# Patient Record
(demographics unavailable — no encounter records)

---

## 2024-10-16 NOTE — CONSULT LETTER
[Dear  ___] : Dear  [unfilled], [Consult Letter:] : I had the pleasure of evaluating your patient, [unfilled]. [Please see my note below.] : Please see my note below. [Consult Closing:] : Thank you very much for allowing me to participate in the care of this patient.  If you have any questions, please do not hesitate to contact me. [Sincerely,] : Sincerely, [FreeTextEntry3] : Silvia Sellers D.O.  of Medicine Hematology/Oncology [facility]

## 2024-10-16 NOTE — REVIEW OF SYSTEMS
[Patient Intake Form Reviewed] : Patient intake form was reviewed [Fatigue] : fatigue [Joint Pain] : joint pain [Dizziness] : dizziness [Anxiety] : anxiety [Negative] : Heme/Lymph [FreeTextEntry9] : left rib pains  [de-identified] : occasional

## 2024-10-16 NOTE — HISTORY OF PRESENT ILLNESS
[de-identified] : This is a 48 year old female who is here for an evaluation of chronic leukopenia/neutropenia.   She has a history of Sjogren's disease with ocular/oral sicca, follows with Dr. Foley, HTN, hypothyroidism, papillary thyroid ca s/p partial right thyroidectomy in 2015.  She has a known leukopenia since 2019- her WBC ranging from 2.8-4.0/ANC 1.0-2.0. Normal Hg/plt.   CBC 8/20/24- WBC 2.7/ANC 1102 Hg 12.2 Plt 144 N40/L47/M10, MCV 81.2  6/24/2020- WBC 3.62 Flow cytometry 4/5/21- increased NK cells.   She was evaluated in the past with Dr. Escalona from hematology, thought to be related to her underlying rheumatologic disease.  She denies any frequent infections, no mouth sores.  No recent hospitalizations.   Has some left sided rib pains, no recent fall.  Had xrays at [facility], no result in chart.  No fever/chills, no night sweats, no weight loss.

## 2024-10-16 NOTE — ASSESSMENT
[FreeTextEntry1] : This is a 48 year old female with a chronic leukopenia/neutropenia since 2019.   Likely related to her underlying autoimmune disorder.    Her counts have been stable since 2019 with her WBC WBC ranging from 2.8-4.0/ANC 1.0-2.0.  No anemia or thrombocytopenia.  Low suspicion for underlying marrow disorder.  Will repeat a work up today- B12/folate, HIV/hepatitis, immunoelectrophoresis.  Also repeat a peripheral blood flow cytometry, in 2011 with increased NK cells.  Check her abdominal sonogram to evaluate for any splenomegaly- not palpable on exam.  If any change in her counts will plan for a bone marrow biopsy then.   She will follow up in 6 months.

## 2024-12-26 NOTE — PHYSICAL EXAM

## 2024-12-26 NOTE — HISTORY OF PRESENT ILLNESS
[FreeTextEntry1] : 49yo female fir evaluation of constipation   She has had chronic constipation for years. She has tried OTC meds without much help She is due for colon cancer screening. She is nervous jasson anesthesia as she was fuzzy for few days after egd anesthesia few years ago She has sjogrens Also with pain in left inferior ribcage

## 2025-03-16 NOTE — PHYSICAL EXAM
[Alert] : alert [Normal Voice/Communication] : normal voice/communication [Healthy Appearing] : healthy appearing [No Acute Distress] : no acute distress [Sclera] : the sclera and conjunctiva were normal [Hearing Threshold Finger Rub Not [place]] : hearing was normal [Normal Lips/Gums] : the lips and gums were normal [Oropharynx] : the oropharynx was normal [Normal Appearance] : the appearance of the neck was normal [No Neck Mass] : no neck mass was observed [No Respiratory Distress] : no respiratory distress [No Acc Muscle Use] : no accessory muscle use [Auscultation Breath Sounds / Voice Sounds] : lungs were clear to auscultation bilaterally [Respiration, Rhythm And Depth] : normal respiratory rhythm and effort [Heart Rate And Rhythm] : heart rate was normal and rhythm regular [Normal S1, S2] : normal S1 and S2 [Murmurs] : no murmurs [Bowel Sounds] : normal bowel sounds [Abdomen Tenderness] : non-tender [No Masses] : no abdominal mass palpated [Abdomen Soft] : soft [] : no hepatosplenomegaly [Oriented To Time, Place, And Person] : oriented to person, place, and time

## 2025-03-16 NOTE — REVIEW OF SYSTEMS
[As Noted in HPI] : as noted in HPI [Constipation] : constipation [Bleeding] : bleeding [Negative] : Heme/Lymph

## 2025-03-16 NOTE — PHYSICAL EXAM
[Alert] : alert [Normal Voice/Communication] : normal voice/communication [Healthy Appearing] : healthy appearing [No Acute Distress] : no acute distress [Sclera] : the sclera and conjunctiva were normal [Hearing Threshold Finger Rub Not [place]] : hearing was normal [Normal Lips/Gums] : the lips and gums were normal [Oropharynx] : the oropharynx was normal [Normal Appearance] : the appearance of the neck was normal [No Neck Mass] : no neck mass was observed [No Respiratory Distress] : no respiratory distress [No Acc Muscle Use] : no accessory muscle use [Auscultation Breath Sounds / Voice Sounds] : lungs were clear to auscultation bilaterally [Respiration, Rhythm And Depth] : normal respiratory rhythm and effort [Heart Rate And Rhythm] : heart rate was normal and rhythm regular [Normal S1, S2] : normal S1 and S2 [Murmurs] : no murmurs [Bowel Sounds] : normal bowel sounds [No Masses] : no abdominal mass palpated [Abdomen Tenderness] : non-tender [Abdomen Soft] : soft [] : no hepatosplenomegaly [Oriented To Time, Place, And Person] : oriented to person, place, and time lab results/need to admit

## 2025-03-16 NOTE — HISTORY OF PRESENT ILLNESS
[FreeTextEntry1] : 47yo female with constipation, rectal bleeding  She has had recent issues with constipation Trulance gave her diarrhea and overall she felt she did better on miralax  She has had some bleeding due to hemorrhoids from straining and constipation (holding off on cologuard until improved)  She has been taking iron but has been giving her dyspepsia

## 2025-03-16 NOTE — HISTORY OF PRESENT ILLNESS
[FreeTextEntry1] : 49yo female with constipation, rectal bleeding  She has had recent issues with constipation Trulance gave her diarrhea and overall she felt she did better on miralax  She has had some bleeding due to hemorrhoids from straining and constipation (holding off on cologuard until improved)  She has been taking iron but has been giving her dyspepsia

## 2025-03-16 NOTE — ASSESSMENT
[FreeTextEntry1] : 49yo female with multiple GI issues  constipation - miralax will check cologuard  recommended SloFe to cut down on her dyspeptic symptoms form ferrous sulfate  Total time spent -- minutes, including chart and record review, face to face time, and chart documentation

## 2025-03-16 NOTE — ASSESSMENT
[FreeTextEntry1] : 47yo female with multiple GI issues  constipation - miralax will check cologuard  recommended SloFe to cut down on her dyspeptic symptoms form ferrous sulfate  Total time spent -- minutes, including chart and record review, face to face time, and chart documentation

## 2025-04-17 NOTE — REVIEW OF SYSTEMS
[Patient Intake Form Reviewed] : Patient intake form was reviewed [Fatigue] : fatigue [Anxiety] : anxiety [Negative] : Heme/Lymph [Suicidal] : not suicidal [Insomnia] : no insomnia [Depression] : no depression [de-identified] : +red area on right forearm

## 2025-04-17 NOTE — HISTORY OF PRESENT ILLNESS
[de-identified] : This is a 48 year old female who is here for an evaluation of chronic leukopenia/neutropenia.   She has a history of Sjogren's disease with ocular/oral sicca, follows with Dr. Foley, HTN, hypothyroidism, papillary thyroid ca s/p partial right thyroidectomy in 2015.  She has a known leukopenia since 2019- her WBC ranging from 2.8-4.0/ANC 1.0-2.0. Normal Hg/plt.   CBC 8/20/24- WBC 2.7/ANC 1102 Hg 12.2 Plt 144 N40/L47/M10, MCV 81.2  6/24/2020- WBC 3.62 Flow cytometry 4/5/21- increased NK cells.   She was evaluated in the past with Dr. Escalona from hematology, thought to be related to her underlying rheumatologic disease.  She denies any frequent infections, no mouth sores.  No recent hospitalizations.   Has some left sided rib pains, no recent fall.  Had xrays at [facility], no result in chart.  No fever/chills, no night sweats, no weight loss.      [de-identified] : The patient reports she is doing okay. She endorses fatigue for a while that comes and goes. She reports she is frequently cold. She has mentioned this to her PCP.  She endorses gradual unintentional weight loss for a few months. Has lost ~5lbs (114 to 111). She states had her thyroid tested in October, which was WNL, but she was told she may be pre diabetic. Her endocrinologist is Dr. Tamara Rice. She was referred to nutrition counseling who she is seeing.  New red bump on her right forearm starting 4-5 days ago.  No fevers or chills, no LAD, no night sweats.

## 2025-04-17 NOTE — CONSULT LETTER
[Dear  ___] : Dear  [unfilled], [Consult Letter:] : I had the pleasure of evaluating your patient, [unfilled]. [Please see my note below.] : Please see my note below. [Consult Closing:] : Thank you very much for allowing me to participate in the care of this patient.  If you have any questions, please do not hesitate to contact me. [Sincerely,] : Sincerely, [FreeTextEntry3] : Silvia Sellers D.O.  of Medicine Hematology/Oncology [facility]

## 2025-04-17 NOTE — ASSESSMENT
[FreeTextEntry1] : This is a 48-year-old female with a chronic leukopenia/neutropenia since 2019.   Likely related to her underlying autoimmune disorder.    Her counts have been stable since 2019 with her WBC WBC ranging from 2.8-4.0/ANC 1.0-2.0.  CBC today with WBC and ANC WNL - WBC 4.40, ANC 2.37, HGB 11.7,  No anemia or thrombocytopenia.  Low suspicion for underlying marrow disorder.  2024 peripheral blood flow cytometry negative, in 2011 with increased NK cells.  10/3/2024 abdominal sonogram to evaluate for any splenomegaly - normal spleen (8.8cm x 10.4cm x 4.6 cm) If any change in her counts will plan for a bone marrow biopsy, then.   Patient endorsing a 5-7 lb weight loss over the last few months. Her appetite is unchanged, and she reports she has not changed her eating habits. She also endorsed fatigue, which has been ongoing for months. She reports she follows with GI who wanted to do a CT A/P and a colonoscopy. She has not gotten the colonoscopy yet and deferred the CT A/P. CT A/P with oral contrast (patient reports she has weird allergies and would prefer to forego IV contrast) ordered to ensure no intrabdominal pathology that may be contributing to her weight loss.  Recommended to follow up with rheumatology regarding unintentional weight loss.  Recommended to follow up with immunology for allergies.   Patient instructed that the redness on her arm resembles a bug bite. The area is just slightly raised, and the erythema has not worsened since it started 4-5 days ago. There is no induration or fluctuance, no warmth, no purulent exudate. Instructed the patient to call her PCP if it worsens or if she develops fevers/chills.   She will follow up in 6-12 months.

## 2025-04-17 NOTE — PHYSICAL EXAM
[Normal] : RRR, normal S1S2, no murmurs, rubs, gallops [de-identified] : supple [de-identified] : +1cm red area on her right arm

## 2025-05-21 NOTE — REASON FOR VISIT
[Follow-Up: _____] : a [unfilled] follow-up visit [FreeTextEntry1] : + SABRA/SSA/B with mild stable ocular sicca

## 2025-05-21 NOTE — ASSESSMENT
[FreeTextEntry1] : FRANCISCA MACKAY is a 48 year old woman with PMH of hypertension and hypothyroidism.   # mild Sjogren's with stable ocular/oral sicca and mild, asymptomatic leukopenia. Initial hx- abnormal labs, sicca sx, hypoK, alopecia, fatigue, paresthesia, prior parotid painless fullness. + SABRA at 1:160, + SSA and SSB antibodies, ESR 20-30s, but K levels normalized and remainder of sx have self resolved and never recurred.  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, resulting in persistent mild ESR elevation - recent labs reviewed with her -- all stable aside from ESR  - given stability and mild dx no need for treatment at present time aside from conservative measures for sicca  - if new/worsening sx would recommend a trial of PLQ - q6-12 months optho f/u   # C spine pain - c/w HEP  # prior unintentional weight loss - has now regained it  # cervical LAD - L rib XR neg for bony issue, US abd also normal  - head/neck US - advised to d/w GI if imaging still needed, if so agree with contrast and low concern from my standpoint given good renal fxn and advised pt this is best test if malignancy is on ddx. Strongly recommended to c/w colonoscopy   RTC in 12 months, sooner if new/worsening sx

## 2025-05-21 NOTE — HISTORY OF PRESENT ILLNESS
[FreeTextEntry1] : 45-year-old  female with a history of hypertension and hypothyroidism seen for further evaluation of  Sjogren's syndrome. She returns after 4 months today. In the interval she has seen pulmonary, cardiology, GI and neurology. Neurology is treating her for trigeminal neuralgia, she's using gabapentin 100 mg b.i.d. She states that her face is numb. She wants to know what this is from. She also saw the other specialists including ENT.  Besides her facial symptoms she complains of arthralgias. She complains of being under stress. She complains of shortness of breath but woke up with pulmonary has been negative. She had a chest x-ray which was normal. She has a history of hypothyroidism for the past few years.  She admits to thinning of her hair. She admits to dry eyes and dry mouth. She denies oral ulcers or cold sensitivity. She denies prolonged morning stiffness or joint pains on a daily basis. She denies fevers chills or night sweats. She denies a poor appetite or weight loss. She states that she generally sleeps well at night. She denies a family history of autoimmunity. She denies any pregnancies and or episodes of thromboembolism. She rates her current sx at a 5/10. She did see the eye doctor, restasis was not covered now she is using another eye drop which seems to be helping on last visit did not want to try Plaquenil. She admits to being under a lot of stress with the virus.   ---------------- 4/26/21 --New to me today. Intermittent paresthesia in b/l hands but no synovitis or inflammatory sx. Mild dry eyes, using drops q12. No oral issues. No other complaints, feels well otherwise, no infectious sx.   8/9/21 -- Since last visit CTD and inflammatory arthritis ROS negative, not needing eye drops any more frequently. No infectious sx. Reports some tension HA, and fluctuating BP, high diastolic today, was told by cardiology to take ACEi prn for high BP as if she takes it consistently she develops hypotension.   1/24/22 -- Remains with stable ocular sicca, without other CTD or inflammatory arthritis symptoms. Feeling well today aside some mild costochondral pain since last night, no pleuritic component, no pressure-like pain. Got Covid in late Dec, mild, fully resolved, not yet vaccinated, remains unsure if she wants to get vaccinated.   4/10/23 -- C spine pain chronically, causes some HA, improved with PT. Sicca stable, using Restasis and prn AT, remainder of CTD ROS negative.    5/20/24 -- Improved neck pain with exercising, no HA. Oral sicca stable with AT, no dry mouth, CTD ROS otherwise neg. No infectious sx, some bleeding hemorrhoids, will be doing FOBT with PMD.   5/19/25 -- Stable sicca, remainder of CTD ROS negative. Unintentional 10 lb weight loss in fall 2024 but now has gained weight back. Ongoing L sided chest wall TTP, prior chiropractic maneuvers helped. Ongoing but stable neck pain. GI and then Heme both recommended CT with contrast and colonoscopy but pt has held off 2/2 worry about contrast as father had CKD.

## 2025-05-21 NOTE — PHYSICAL EXAM
[General Appearance - Alert] : alert [General Appearance - In No Acute Distress] : in no acute distress [General Appearance - Well Nourished] : well nourished [General Appearance - Well Developed] : well developed [Sclera] : the sclera and conjunctiva were normal [PERRL With Normal Accommodation] : pupils were equal in size, round, and reactive to light [Extraocular Movements] : extraocular movements were intact [Outer Ear] : the ears and nose were normal in appearance [Nasal Cavity] : the nasal mucosa and septum were normal [Oropharynx] : the oropharynx was normal [Neck Appearance] : the appearance of the neck was normal [Respiration, Rhythm And Depth] : normal respiratory rhythm and effort [Auscultation Breath Sounds / Voice Sounds] : lungs were clear to auscultation bilaterally [Heart Rate And Rhythm] : heart rate was normal and rhythm regular [Heart Sounds] : normal S1 and S2 [Murmurs] : no murmurs [Edema] : there was no peripheral edema [Bowel Sounds] : normal bowel sounds [Abdomen Soft] : soft [Abdomen Tenderness] : non-tender [No CVA Tenderness] : no ~M costovertebral angle tenderness [No Spinal Tenderness] : no spinal tenderness [Abnormal Walk] : normal gait [Nail Clubbing] : no clubbing  or cyanosis of the fingernails [Musculoskeletal - Swelling] : no joint swelling seen [Motor Tone] : muscle strength and tone were normal [Skin Color & Pigmentation] : normal skin color and pigmentation [Motor Exam] : the motor exam was normal [No Focal Deficits] : no focal deficits [Oriented To Time, Place, And Person] : oriented to person, place, and time [Impaired Insight] : insight and judgment were intact [Affect] : the affect was normal [Thyroid Diffuse Enlargement] : the thyroid was not enlarged [Thyroid Nodule] : there were no palpable thyroid nodules [] : no respiratory distress [Cervical Lymph Nodes Enlarged Posterior Bilaterally] : posterior cervical [Supraclavicular Lymph Nodes Enlarged Bilaterally] : supraclavicular [FreeTextEntry1] : FROM diffusely, no synovitis

## 2025-05-21 NOTE — REVIEW OF SYSTEMS
[Dry Eyes] : dryness of the eyes [Arthralgias] : arthralgias [Joint Pain] : joint pain [Negative] : Heme/Lymph [As Noted in HPI] : as noted in HPI [Fever] : no fever [Chills] : no chills [Recent Weight Loss (___ Lbs)] : recent [unfilled] ~Ulb weight loss [Eye Pain] : no eye pain [Joint Swelling] : no joint swelling [Joint Stiffness] : no joint stiffness [FreeTextEntry4] : dry mouth

## 2025-06-03 NOTE — ADDENDUM
[FreeTextEntry1] : This note was written by Marcia Aguilar on 06/03/2025 acting as a medical scribe for Dr. Edgard Lauren MD. All medical entries made by the scribe were at my, Dr. Edgard Lauren's, direction and personally dictated by me on 06/03/2025. I have reviewed the chart and agree that the record accurately reflects my personal performance of the history, review of systems, assessment, and plan. I have also personally directed, reviewed, and agreed with the chart.

## 2025-06-03 NOTE — DATA REVIEWED
[FreeTextEntry1] : Spirometric analysis reveals mild airway obstruction with significant bronchodilator reactivity demonstrated.

## 2025-06-03 NOTE — HISTORY OF PRESENT ILLNESS
[FreeTextEntry1] :  The patient comes in for a routine follow-up pulmonary evaluation. [de-identified] : At the time of her initial appointment in August 2023, she was c/o daytime fatigue. The patient stated that when she woke up in the morning, she still felt fatigued. The daytime fatigue had been present for several years, but she had just recently started to take note of it. She lives with her , who has confirmed to her that she snores, and occasionally startled in bed. She noted that she dreamed every night and remembered most of her dreams. She denied falling asleep while driving. She did not drink caffeine. She occasionally woke up with morning headaches when the weather is too dry or hot. She noted that she did not usually nap. At that time, I ordered an at home polysomnogram for the patient to evaluate for possible sleep apnea. The study was performed in September 2023, which was negative for JAMIE.   At this time, the patient reports that she is feeling relatively well. She does report that she continues to feel fatigued, but notes that she does sleep well at night. She does note that she was diagnosed with anemia, which she feels may be contributing to her fatigue.  She was treated with iron therapy for several months.  Her hemoglobin improved slightly, but her symptoms of fatigue did not improve.  She does continue to snore.   She is also following with rheumatologist, Dr. Foley for treatment of Sjogren's. She is felt to be stable in this regard. There have been no cough, fevers, chills, or night sweats. There have been no other acute constitutional symptoms. She comes in for this assessment.

## 2025-06-03 NOTE — PLAN
[FreeTextEntry1] : 1. Continue current medications as outlined above.   2. Follow up on as needed basis, as there does not appear to be any need for any further pulmonary intervention at this time.   3. Routine medical follow-up with her primary care physician.

## 2025-06-03 NOTE — PHYSICAL EXAM
[No Acute Distress] : no acute distress [Well Nourished] : well nourished [Normal Oropharynx] : the oropharynx was normal [No JVD] : no jugular venous distention [Supple] : supple [No Respiratory Distress] : no respiratory distress  [No Accessory Muscle Use] : no accessory muscle use [Clear to Auscultation] : lungs were clear to auscultation bilaterally [Normal Rate] : normal rate  [Regular Rhythm] : with a regular rhythm [Normal S1, S2] : normal S1 and S2 [No Edema] : there was no peripheral edema [No Extremity Clubbing/Cyanosis] : no extremity clubbing/cyanosis [Soft] : abdomen soft [Normal Bowel Sounds] : normal bowel sounds [Normal Posterior Cervical Nodes] : no posterior cervical lymphadenopathy [Normal Anterior Cervical Nodes] : no anterior cervical lymphadenopathy [No Rash] : no rash [Normal Affect] : the affect was normal [Normal Insight/Judgement] : insight and judgment were intact

## 2025-07-02 NOTE — PROCEDURE
[de-identified] : Indication for procedure:  Unable to adequately examine mid and posterior nasal cavity with anterior rhinoscopy The patient has blood in upper airway  Sinus endoscope # 86 Nasal septum exam shows septal deviation to the rt 2 plus The inferior nasal turbinates are moderately hypertrophic in size bilaterally. The sinus endoscope was introduced into the right nares exam right middle meatus reveals no mucopus or inflammation.  The right middle turbinate is WNL. The scope was advanced and the sphenoethmoid region was inspected. The superior meatus, superior turbinate and nasal vault are unremarkable.  The nasopharynx is unremarkable without inflammation or mass. The sinus endoscope was introduced into the left nares and exam left middle meatus reveals no mucopus or inflammation.  The left middle turbinate is WNL. The scope was advanced and the sphenoethmoid region was inspected. The left superior meatus and nasal vault are unremarkable. The fiberoptic scope was introduced to the posterior pharynx and exam of the endolarynx is wnl w good vocal cord mobility. No lesion noted in hypopharynx.  There is no erythema or edema of the posterior larynx to suggest reflux.  [Cerumen Impaction] : Cerumen Impaction [FreeTextEntry6] : Indication: ear plugging and discomfort Large amount cerumen cleared left and right ear instrumentation with curettes, forceps and suction. Ear canals and tympanic membranes  unremarkable.

## 2025-07-02 NOTE — ASSESSMENT
[FreeTextEntry1] : cerumen cleared au dev septum Sjogren's w oral sicca see no source for blood in mouth exam nose pharynx larynx unremarkable mometasone ointment prn ear eczema

## 2025-07-02 NOTE — HISTORY OF PRESENT ILLNESS
[de-identified] : occasional pain rt ear c/o blood in mouth no anterior nose bleed no dysphagia no cough hx ear eczema-fluocinolone

## 2025-07-02 NOTE — REVIEW OF SYSTEMS
[Throat Dryness] : throat dryness [Patient Intake Form Reviewed] : Patient intake form was reviewed [Nasal Congestion] : no nasal congestion [Nose Bleeds] : no nose bleeds [Sinus Pain] : no sinus pain [Sinus Pressure] : no sinus pressure [Sense Of Smell Problem] : no sense of smell problem [Discolored Nasal Discharge] : no discolored nasal discharge [Hoarseness] : no hoarseness [Throat Clearing] : no throat clearing [Throat Pain] : no throat pain [Throat Itching] : no throat itching [Negative] : Mouth and Throat [de-identified] : dry crusty scabby nose, pt stated spitting blood occ when its hot  [de-identified] : dry cough

## 2025-07-02 NOTE — PHYSICAL EXAM
[de-identified] : erin [Nasal Endoscopy Performed] : nasal endoscopy was performed, see procedure section for findings [] : septum deviated to the right [Midline] : trachea located in midline position [Normal] : no rashes